# Patient Record
Sex: MALE | Race: WHITE | NOT HISPANIC OR LATINO | Employment: OTHER | URBAN - METROPOLITAN AREA
[De-identification: names, ages, dates, MRNs, and addresses within clinical notes are randomized per-mention and may not be internally consistent; named-entity substitution may affect disease eponyms.]

---

## 2023-08-22 DIAGNOSIS — E03.9 HYPOTHYROIDISM, UNSPECIFIED: ICD-10-CM

## 2023-08-22 DIAGNOSIS — E78.2 MIXED HYPERLIPIDEMIA: ICD-10-CM

## 2023-08-22 RX ORDER — LEVOTHYROXINE SODIUM 112 UG/1
112 TABLET ORAL DAILY
Qty: 90 TABLET | Refills: 1 | Status: SHIPPED | OUTPATIENT
Start: 2023-08-22 | End: 2024-02-19

## 2023-08-22 RX ORDER — ROSUVASTATIN CALCIUM 20 MG/1
20 TABLET, COATED ORAL DAILY
Qty: 90 TABLET | Refills: 1 | Status: SHIPPED | OUTPATIENT
Start: 2023-08-22 | End: 2024-02-19

## 2023-09-25 ENCOUNTER — OFFICE VISIT (OUTPATIENT)
Dept: PRIMARY CARE | Facility: CLINIC | Age: 68
End: 2023-09-25
Payer: MEDICARE

## 2023-09-25 VITALS
WEIGHT: 166 LBS | SYSTOLIC BLOOD PRESSURE: 120 MMHG | HEIGHT: 66 IN | DIASTOLIC BLOOD PRESSURE: 60 MMHG | BODY MASS INDEX: 26.68 KG/M2

## 2023-09-25 DIAGNOSIS — Z12.5 SCREENING FOR PROSTATE CANCER: ICD-10-CM

## 2023-09-25 DIAGNOSIS — E03.9 HYPOTHYROIDISM, UNSPECIFIED TYPE: ICD-10-CM

## 2023-09-25 DIAGNOSIS — R53.83 FATIGUE, UNSPECIFIED TYPE: ICD-10-CM

## 2023-09-25 DIAGNOSIS — Z00.00 ROUTINE GENERAL MEDICAL EXAMINATION AT HEALTH CARE FACILITY: ICD-10-CM

## 2023-09-25 DIAGNOSIS — R39.12 BENIGN PROSTATIC HYPERPLASIA WITH WEAK URINARY STREAM: ICD-10-CM

## 2023-09-25 DIAGNOSIS — E78.2 MIXED HYPERLIPIDEMIA: ICD-10-CM

## 2023-09-25 DIAGNOSIS — Z00.00 MEDICARE ANNUAL WELLNESS VISIT, SUBSEQUENT: Primary | ICD-10-CM

## 2023-09-25 DIAGNOSIS — N40.1 BENIGN PROSTATIC HYPERPLASIA WITH WEAK URINARY STREAM: ICD-10-CM

## 2023-09-25 PROCEDURE — G0439 PPPS, SUBSEQ VISIT: HCPCS | Performed by: INTERNAL MEDICINE

## 2023-09-25 PROCEDURE — 1160F RVW MEDS BY RX/DR IN RCRD: CPT | Performed by: INTERNAL MEDICINE

## 2023-09-25 PROCEDURE — 1036F TOBACCO NON-USER: CPT | Performed by: INTERNAL MEDICINE

## 2023-09-25 PROCEDURE — 1159F MED LIST DOCD IN RCRD: CPT | Performed by: INTERNAL MEDICINE

## 2023-09-25 RX ORDER — PROPRANOLOL/HYDROCHLOROTHIAZID 40 MG-25MG
450 TABLET ORAL AS NEEDED
COMMUNITY

## 2023-09-25 ASSESSMENT — ENCOUNTER SYMPTOMS
NEUROLOGICAL NEGATIVE: 1
HEMATURIA: 0
DYSURIA: 0
CARDIOVASCULAR NEGATIVE: 1
POLYPHAGIA: 0
HEMATOLOGIC/LYMPHATIC NEGATIVE: 1
POLYDIPSIA: 0
GASTROINTESTINAL NEGATIVE: 1
DIFFICULTY URINATING: 0
ARTHRALGIAS: 0
CONSTITUTIONAL NEGATIVE: 1
PSYCHIATRIC NEGATIVE: 1
DEPRESSION: 0
RESPIRATORY NEGATIVE: 1

## 2023-09-25 NOTE — PROGRESS NOTES
"Subjective   Reason for Visit: Raymond Childs is an 68 y.o. male here for a Medicare Wellness visit.          Reviewed all medications by prescribing practitioner or clinical pharmacist (such as prescriptions, OTCs, herbal therapies and supplements) and documented in the medical record.    HPI patient is here for wellness exam he is doing well blood pressure is good he has hyperlipidemia and he is hypothyroid on levothyroxine.    Patient Care Team:  Demetra Castelan MD as PCP - General  Demetra Castelan MD as PCP - [facility] Attributed Provider     Review of Systems   Constitutional: Negative.    HENT: Negative.     Respiratory: Negative.     Cardiovascular: Negative.    Gastrointestinal: Negative.    Endocrine: Negative for cold intolerance, heat intolerance, polydipsia and polyphagia.   Genitourinary:  Negative for difficulty urinating, dysuria, enuresis, hematuria and penile discharge.   Musculoskeletal:  Negative for arthralgias and gait problem.   Neurological: Negative.    Hematological: Negative.    Psychiatric/Behavioral: Negative.         Objective   Vitals:  /60 (BP Location: Left arm, Patient Position: Sitting, BP Cuff Size: Large adult)   Ht 1.664 m (5' 5.5\")   Wt 75.3 kg (166 lb)   BMI 27.20 kg/m²       Physical Exam  Vitals reviewed.   Constitutional:       Appearance: Normal appearance.   HENT:      Head: Atraumatic.   Eyes:      Extraocular Movements: Extraocular movements intact.      Pupils: Pupils are equal, round, and reactive to light.   Abdominal:      Palpations: Abdomen is soft. There is no mass.   Neurological:      General: No focal deficit present.      Mental Status: He is alert and oriented to person, place, and time.   Psychiatric:         Mood and Affect: Mood normal.         Behavior: Behavior normal.         Thought Content: Thought content normal.       Assessment/Plan   Problem List Items Addressed This Visit       Medicare annual wellness visit, subsequent - " Primary     Other Visit Diagnoses       Screening for prostate cancer        Relevant Orders    Prostate Specific Antigen, Screen    Routine general medical examination at health care facility            Patient advised to get blood work done he is on saw palmetto with the BPH and is not waking up at night and not having other issues he is up to date with screening colonoscopies.  His last CAT scan had shown fatty liver which was incidental finding he will get LFTs checked he drinks about 3 beers in a week he is a reformed smoker had smoked for 10 years in his life and was smoking 7 to 8 cigarettes at that time but that was 30 years ago.  Patient advised about flu shots.

## 2023-10-04 ENCOUNTER — LAB (OUTPATIENT)
Dept: LAB | Facility: LAB | Age: 68
End: 2023-10-04
Payer: MEDICARE

## 2023-10-04 DIAGNOSIS — E03.9 HYPOTHYROIDISM, UNSPECIFIED TYPE: ICD-10-CM

## 2023-10-04 DIAGNOSIS — Z12.5 SCREENING FOR PROSTATE CANCER: ICD-10-CM

## 2023-10-04 DIAGNOSIS — R53.83 FATIGUE, UNSPECIFIED TYPE: ICD-10-CM

## 2023-10-04 DIAGNOSIS — E78.2 MIXED HYPERLIPIDEMIA: ICD-10-CM

## 2023-10-04 LAB
ALBUMIN SERPL BCP-MCNC: 4.2 G/DL (ref 3.4–5)
ALP SERPL-CCNC: 59 U/L (ref 33–136)
ALT SERPL W P-5'-P-CCNC: 28 U/L (ref 10–52)
ANION GAP SERPL CALC-SCNC: 10 MMOL/L (ref 10–20)
AST SERPL W P-5'-P-CCNC: 22 U/L (ref 9–39)
BASOPHILS # BLD AUTO: 0.09 X10*3/UL (ref 0–0.1)
BASOPHILS NFR BLD AUTO: 1.7 %
BILIRUB SERPL-MCNC: 0.9 MG/DL (ref 0–1.2)
BUN SERPL-MCNC: 17 MG/DL (ref 6–23)
CALCIUM SERPL-MCNC: 9.4 MG/DL (ref 8.6–10.3)
CHLORIDE SERPL-SCNC: 105 MMOL/L (ref 98–107)
CHOLEST SERPL-MCNC: 145 MG/DL (ref 0–199)
CHOLESTEROL/HDL RATIO: 2.2
CO2 SERPL-SCNC: 30 MMOL/L (ref 21–32)
CREAT SERPL-MCNC: 1.02 MG/DL (ref 0.5–1.3)
EOSINOPHIL # BLD AUTO: 0.21 X10*3/UL (ref 0–0.7)
EOSINOPHIL NFR BLD AUTO: 3.9 %
ERYTHROCYTE [DISTWIDTH] IN BLOOD BY AUTOMATED COUNT: 13.5 % (ref 11.5–14.5)
GFR SERPL CREATININE-BSD FRML MDRD: 80 ML/MIN/1.73M*2
GLUCOSE SERPL-MCNC: 103 MG/DL (ref 74–99)
HCT VFR BLD AUTO: 47.2 % (ref 41–52)
HDLC SERPL-MCNC: 66.1 MG/DL
HGB BLD-MCNC: 15.4 G/DL (ref 13.5–17.5)
IMM GRANULOCYTES # BLD AUTO: 0.01 X10*3/UL (ref 0–0.7)
IMM GRANULOCYTES NFR BLD AUTO: 0.2 % (ref 0–0.9)
LDLC SERPL CALC-MCNC: 62 MG/DL (ref 140–190)
LDLC SERPL DIRECT ASSAY-MCNC: 64 MG/DL (ref 0–129)
LYMPHOCYTES # BLD AUTO: 1.53 X10*3/UL (ref 1.2–4.8)
LYMPHOCYTES NFR BLD AUTO: 28.1 %
MCH RBC QN AUTO: 30.4 PG (ref 26–34)
MCHC RBC AUTO-ENTMCNC: 32.6 G/DL (ref 32–36)
MCV RBC AUTO: 93 FL (ref 80–100)
MONOCYTES # BLD AUTO: 0.55 X10*3/UL (ref 0.1–1)
MONOCYTES NFR BLD AUTO: 10.1 %
NEUTROPHILS # BLD AUTO: 3.06 X10*3/UL (ref 1.2–7.7)
NEUTROPHILS NFR BLD AUTO: 56 %
NON HDL CHOLESTEROL: 79 MG/DL (ref 0–149)
NRBC BLD-RTO: 0 /100 WBCS (ref 0–0)
PLATELET # BLD AUTO: 158 X10*3/UL (ref 150–450)
PMV BLD AUTO: 11.7 FL (ref 7.5–11.5)
POTASSIUM SERPL-SCNC: 4.6 MMOL/L (ref 3.5–5.3)
PROT SERPL-MCNC: 7 G/DL (ref 6.4–8.2)
PSA SERPL-MCNC: 0.45 NG/ML
RBC # BLD AUTO: 5.06 X10*6/UL (ref 4.5–5.9)
SODIUM SERPL-SCNC: 140 MMOL/L (ref 136–145)
TRIGL SERPL-MCNC: 86 MG/DL (ref 0–149)
TSH SERPL-ACNC: 0.74 MIU/L (ref 0.44–3.98)
VLDL: 17 MG/DL (ref 0–40)
WBC # BLD AUTO: 5.5 X10*3/UL (ref 4.4–11.3)

## 2023-10-04 PROCEDURE — 83721 ASSAY OF BLOOD LIPOPROTEIN: CPT

## 2023-10-04 PROCEDURE — 36415 COLL VENOUS BLD VENIPUNCTURE: CPT

## 2024-02-18 DIAGNOSIS — E78.2 MIXED HYPERLIPIDEMIA: ICD-10-CM

## 2024-02-18 DIAGNOSIS — E03.9 HYPOTHYROIDISM, UNSPECIFIED: ICD-10-CM

## 2024-02-19 RX ORDER — LEVOTHYROXINE SODIUM 112 UG/1
112 TABLET ORAL DAILY
Qty: 90 TABLET | Refills: 3 | Status: SHIPPED | OUTPATIENT
Start: 2024-02-19

## 2024-02-19 RX ORDER — ROSUVASTATIN CALCIUM 20 MG/1
20 TABLET, COATED ORAL DAILY
Qty: 90 TABLET | Refills: 3 | Status: SHIPPED | OUTPATIENT
Start: 2024-02-19

## 2024-09-19 ENCOUNTER — APPOINTMENT (OUTPATIENT)
Dept: PRIMARY CARE | Facility: CLINIC | Age: 69
End: 2024-09-19
Payer: MEDICARE

## 2024-09-19 VITALS
HEIGHT: 65 IN | SYSTOLIC BLOOD PRESSURE: 114 MMHG | DIASTOLIC BLOOD PRESSURE: 60 MMHG | BODY MASS INDEX: 28.32 KG/M2 | WEIGHT: 170 LBS

## 2024-09-19 DIAGNOSIS — E03.9 HYPOTHYROIDISM, UNSPECIFIED TYPE: ICD-10-CM

## 2024-09-19 DIAGNOSIS — Z12.11 COLON CANCER SCREENING: ICD-10-CM

## 2024-09-19 DIAGNOSIS — Z00.00 MEDICARE ANNUAL WELLNESS VISIT, SUBSEQUENT: Primary | ICD-10-CM

## 2024-09-19 DIAGNOSIS — Z12.5 PROSTATE CANCER SCREENING: ICD-10-CM

## 2024-09-19 DIAGNOSIS — R53.83 FATIGUE, UNSPECIFIED TYPE: ICD-10-CM

## 2024-09-19 DIAGNOSIS — E78.2 MIXED HYPERLIPIDEMIA: ICD-10-CM

## 2024-09-19 PROCEDURE — G0439 PPPS, SUBSEQ VISIT: HCPCS | Performed by: INTERNAL MEDICINE

## 2024-09-19 PROCEDURE — 3008F BODY MASS INDEX DOCD: CPT | Performed by: INTERNAL MEDICINE

## 2024-09-19 PROCEDURE — 1160F RVW MEDS BY RX/DR IN RCRD: CPT | Performed by: INTERNAL MEDICINE

## 2024-09-19 PROCEDURE — 1159F MED LIST DOCD IN RCRD: CPT | Performed by: INTERNAL MEDICINE

## 2024-09-19 PROCEDURE — 1036F TOBACCO NON-USER: CPT | Performed by: INTERNAL MEDICINE

## 2024-09-19 ASSESSMENT — PATIENT HEALTH QUESTIONNAIRE - PHQ9
2. FEELING DOWN, DEPRESSED OR HOPELESS: NOT AT ALL
1. LITTLE INTEREST OR PLEASURE IN DOING THINGS: NOT AT ALL
SUM OF ALL RESPONSES TO PHQ9 QUESTIONS 1 & 2: 0

## 2024-09-19 ASSESSMENT — LIFESTYLE VARIABLES
HOW OFTEN DO YOU HAVE SIX OR MORE DRINKS ON ONE OCCASION: NEVER
HOW MANY STANDARD DRINKS CONTAINING ALCOHOL DO YOU HAVE ON A TYPICAL DAY: PATIENT DOES NOT DRINK
HOW OFTEN DO YOU HAVE A DRINK CONTAINING ALCOHOL: NEVER
SKIP TO QUESTIONS 9-10: 1
AUDIT-C TOTAL SCORE: 0

## 2024-09-19 ASSESSMENT — ENCOUNTER SYMPTOMS
ENDOCRINE NEGATIVE: 1
EYES NEGATIVE: 1
CONSTITUTIONAL NEGATIVE: 1
CARDIOVASCULAR NEGATIVE: 1

## 2024-09-19 NOTE — PROGRESS NOTES
"Subjective   Patient ID: Raymond Childs is a 69 y.o. male who presents for Annual Exam (Patient here for follow up).    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    Review of Systems   Constitutional: Negative.    Eyes: Negative.    Cardiovascular: Negative.    Endocrine: Negative.    Genitourinary: Negative.    Musculoskeletal:         Arthritis Hands,Knees   All other systems reviewed and are negative.      Objective   /60 (BP Location: Left arm, Patient Position: Sitting, BP Cuff Size: Adult long)   Ht 1.651 m (5' 5\")   Wt 77.1 kg (170 lb)   BMI 28.29 kg/m²     Physical Exam  Vitals reviewed.   Constitutional:       Appearance: Normal appearance.   HENT:      Head: Normocephalic and atraumatic.   Cardiovascular:      Rate and Rhythm: Normal rate and regular rhythm.   Pulmonary:      Effort: Pulmonary effort is normal.      Breath sounds: Normal breath sounds.   Abdominal:      Palpations: Abdomen is soft.   Musculoskeletal:      Right lower leg: No edema.      Left lower leg: No edema.   Neurological:      General: No focal deficit present.      Mental Status: He is alert and oriented to person, place, and time.   Psychiatric:         Mood and Affect: Mood normal.         Behavior: Behavior normal.         Thought Content: Thought content normal.       Assessment/Plan   Problem List Items Addressed This Visit             ICD-10-CM    Medicare annual wellness visit, subsequent - Primary Z00.00    Relevant Orders    CBC and Auto Differential     Other Visit Diagnoses         Codes    Mixed hyperlipidemia     E78.2    Relevant Orders    Cholesterol, LDL Direct    Comprehensive Metabolic Panel    Lipid Panel    Hypothyroidism, unspecified type     E03.9    Relevant " Orders    TSH with reflex to Free T4 if abnormal    Fatigue, unspecified type     R53.83    Relevant Orders    CBC and Auto Differential    Prostate cancer screening     Z12.5    Relevant Orders    Prostate Specific Antigen          Blood work ordered.  Continue levothyroxine for hypothyroidism.  Continue rosuvastatin for hyperlipidemia.  Follow-up with us in 6 to 12 months.

## 2024-09-24 ENCOUNTER — LAB (OUTPATIENT)
Dept: LAB | Facility: LAB | Age: 69
End: 2024-09-24
Payer: MEDICARE

## 2024-09-24 DIAGNOSIS — Z12.5 PROSTATE CANCER SCREENING: ICD-10-CM

## 2024-09-24 DIAGNOSIS — E78.2 MIXED HYPERLIPIDEMIA: ICD-10-CM

## 2024-09-24 DIAGNOSIS — E03.9 HYPOTHYROIDISM, UNSPECIFIED TYPE: ICD-10-CM

## 2024-09-24 DIAGNOSIS — R53.83 FATIGUE, UNSPECIFIED TYPE: ICD-10-CM

## 2024-09-24 DIAGNOSIS — Z00.00 MEDICARE ANNUAL WELLNESS VISIT, SUBSEQUENT: ICD-10-CM

## 2024-09-24 LAB
ALBUMIN SERPL BCP-MCNC: 4.1 G/DL (ref 3.4–5)
ALP SERPL-CCNC: 60 U/L (ref 33–136)
ALT SERPL W P-5'-P-CCNC: 19 U/L (ref 10–52)
ANION GAP SERPL CALC-SCNC: 11 MMOL/L (ref 10–20)
AST SERPL W P-5'-P-CCNC: 17 U/L (ref 9–39)
BASOPHILS # BLD AUTO: 0.06 X10*3/UL (ref 0–0.1)
BASOPHILS NFR BLD AUTO: 1.3 %
BILIRUB SERPL-MCNC: 1 MG/DL (ref 0–1.2)
BUN SERPL-MCNC: 16 MG/DL (ref 6–23)
CALCIUM SERPL-MCNC: 9.5 MG/DL (ref 8.6–10.3)
CHLORIDE SERPL-SCNC: 105 MMOL/L (ref 98–107)
CHOLEST SERPL-MCNC: 139 MG/DL (ref 0–199)
CHOLESTEROL/HDL RATIO: 2.4
CO2 SERPL-SCNC: 27 MMOL/L (ref 21–32)
CREAT SERPL-MCNC: 1 MG/DL (ref 0.5–1.3)
EGFRCR SERPLBLD CKD-EPI 2021: 81 ML/MIN/1.73M*2
EOSINOPHIL # BLD AUTO: 0.13 X10*3/UL (ref 0–0.7)
EOSINOPHIL NFR BLD AUTO: 2.8 %
ERYTHROCYTE [DISTWIDTH] IN BLOOD BY AUTOMATED COUNT: 13.2 % (ref 11.5–14.5)
GLUCOSE SERPL-MCNC: 90 MG/DL (ref 74–99)
HCT VFR BLD AUTO: 46.2 % (ref 41–52)
HDLC SERPL-MCNC: 58.5 MG/DL
HGB BLD-MCNC: 15.2 G/DL (ref 13.5–17.5)
IMM GRANULOCYTES # BLD AUTO: 0 X10*3/UL (ref 0–0.7)
IMM GRANULOCYTES NFR BLD AUTO: 0 % (ref 0–0.9)
LDLC SERPL CALC-MCNC: 69 MG/DL
LDLC SERPL DIRECT ASSAY-MCNC: 68 MG/DL (ref 0–129)
LYMPHOCYTES # BLD AUTO: 1.61 X10*3/UL (ref 1.2–4.8)
LYMPHOCYTES NFR BLD AUTO: 35.2 %
MCH RBC QN AUTO: 30.5 PG (ref 26–34)
MCHC RBC AUTO-ENTMCNC: 32.9 G/DL (ref 32–36)
MCV RBC AUTO: 93 FL (ref 80–100)
MONOCYTES # BLD AUTO: 0.5 X10*3/UL (ref 0.1–1)
MONOCYTES NFR BLD AUTO: 10.9 %
NEUTROPHILS # BLD AUTO: 2.27 X10*3/UL (ref 1.2–7.7)
NEUTROPHILS NFR BLD AUTO: 49.8 %
NON HDL CHOLESTEROL: 81 MG/DL (ref 0–149)
NRBC BLD-RTO: 0 /100 WBCS (ref 0–0)
PLATELET # BLD AUTO: 170 X10*3/UL (ref 150–450)
POTASSIUM SERPL-SCNC: 4.4 MMOL/L (ref 3.5–5.3)
PROT SERPL-MCNC: 6.7 G/DL (ref 6.4–8.2)
PSA SERPL-MCNC: 0.38 NG/ML
RBC # BLD AUTO: 4.99 X10*6/UL (ref 4.5–5.9)
SODIUM SERPL-SCNC: 139 MMOL/L (ref 136–145)
T4 FREE SERPL-MCNC: 1.35 NG/DL (ref 0.61–1.12)
TRIGL SERPL-MCNC: 59 MG/DL (ref 0–149)
TSH SERPL-ACNC: 0.32 MIU/L (ref 0.44–3.98)
VLDL: 12 MG/DL (ref 0–40)
WBC # BLD AUTO: 4.6 X10*3/UL (ref 4.4–11.3)

## 2024-09-24 PROCEDURE — 85025 COMPLETE CBC W/AUTO DIFF WBC: CPT

## 2024-09-24 PROCEDURE — 84443 ASSAY THYROID STIM HORMONE: CPT

## 2024-09-24 PROCEDURE — 80061 LIPID PANEL: CPT

## 2024-09-24 PROCEDURE — 80053 COMPREHEN METABOLIC PANEL: CPT

## 2024-09-24 PROCEDURE — 83721 ASSAY OF BLOOD LIPOPROTEIN: CPT

## 2024-09-24 PROCEDURE — G0103 PSA SCREENING: HCPCS

## 2024-09-24 PROCEDURE — 84439 ASSAY OF FREE THYROXINE: CPT

## 2024-09-24 PROCEDURE — 36415 COLL VENOUS BLD VENIPUNCTURE: CPT

## 2024-10-23 ENCOUNTER — APPOINTMENT (OUTPATIENT)
Dept: SURGERY | Facility: CLINIC | Age: 69
End: 2024-10-23
Payer: MEDICARE

## 2024-10-23 VITALS
BODY MASS INDEX: 28.29 KG/M2 | SYSTOLIC BLOOD PRESSURE: 115 MMHG | DIASTOLIC BLOOD PRESSURE: 73 MMHG | WEIGHT: 170 LBS | OXYGEN SATURATION: 96 % | HEART RATE: 76 BPM

## 2024-10-23 DIAGNOSIS — Z12.11 COLON CANCER SCREENING: Primary | ICD-10-CM

## 2024-10-23 PROCEDURE — 1126F AMNT PAIN NOTED NONE PRSNT: CPT | Performed by: SURGERY

## 2024-10-23 PROCEDURE — 99202 OFFICE O/P NEW SF 15 MIN: CPT | Performed by: SURGERY

## 2024-10-23 PROCEDURE — 1159F MED LIST DOCD IN RCRD: CPT | Performed by: SURGERY

## 2024-10-23 ASSESSMENT — ENCOUNTER SYMPTOMS
NEUROLOGICAL NEGATIVE: 1
CONSTITUTIONAL NEGATIVE: 1
ALLERGIC/IMMUNOLOGIC NEGATIVE: 1
GASTROINTESTINAL NEGATIVE: 1
ENDOCRINE NEGATIVE: 1
MUSCULOSKELETAL NEGATIVE: 1
CARDIOVASCULAR NEGATIVE: 1
EYES NEGATIVE: 1
RESPIRATORY NEGATIVE: 1
PSYCHIATRIC NEGATIVE: 1
HEMATOLOGIC/LYMPHATIC NEGATIVE: 1

## 2024-10-23 ASSESSMENT — PAIN SCALES - GENERAL: PAINLEVEL_OUTOF10: 0-NO PAIN

## 2024-10-23 NOTE — PROGRESS NOTES
Subjective   Patient ID: Raymond Childs is a 69 y.o. male who presents for Colon Cancer Screening.  HPI  Patient presents to discuss colonoscopy. No complaints at the present time including change of bowel habits, blood in the stool, hemorrhoid complaints, or abdominal pain. No upper abdominal complaints including reflux, dysphagia, heartburn, early satiety. Weight stable.    Last 10 years ago, normal    Review of Systems   Constitutional: Negative.    HENT: Negative.     Eyes: Negative.    Respiratory: Negative.     Cardiovascular: Negative.    Gastrointestinal: Negative.    Endocrine: Negative.    Genitourinary: Negative.    Musculoskeletal: Negative.    Skin: Negative.    Allergic/Immunologic: Negative.    Neurological: Negative.    Hematological: Negative.    Psychiatric/Behavioral: Negative.           Past Medical History:   Diagnosis Date    Other specified health status     No pertinent past medical history    Personal history of other diseases of the musculoskeletal system and connective tissue 01/21/2021    History of arthritis    Unspecified visual loss 01/21/2021    Vision problems     Past Surgical History:   Procedure Laterality Date    COLONOSCOPY  09/15/2017    Colonoscopy (Fiberoptic)    ESOPHAGOGASTRODUODENOSCOPY  10/13/2017    Diagnostic Esophagogastroduodenoscopy    HERNIA REPAIR  10/28/2021    Hernia Repair    LYMPH NODE BIOPSY  04/21/2017    Biopsy Lymph Node    OTHER SURGICAL HISTORY  01/21/2021    Joint replacement procedure    OTHER SURGICAL HISTORY  10/28/2021    Esophagogastroduodenoscopy    OTHER SURGICAL HISTORY  10/28/2021    Lymph node biopsy    OTHER SURGICAL HISTORY  10/27/2017    Esophagogastrectomy    TONSILLECTOMY  04/21/2017    Tonsillectomy     No family history on file.   Social History     Socioeconomic History    Marital status:    Tobacco Use    Smoking status: Never     Passive exposure: Never    Smokeless tobacco: Never   Substance and Sexual Activity    Alcohol  use: Not Currently     Comment: social drinker    Drug use: Never          Current Outpatient Medications:     levothyroxine (Synthroid, Levoxyl) 112 mcg tablet, TAKE 1 TABLET BY MOUTH EVERY DAY AS DIRECTED, Disp: 90 tablet, Rfl: 3    rosuvastatin (Crestor) 20 mg tablet, TAKE 1 TABLET BY MOUTH EVERY DAY, Disp: 90 tablet, Rfl: 3    turmeric-turmeric root extract 450-50 mg capsule, Take 450 mg by mouth if needed., Disp: , Rfl:      Objective   Vitals:    10/23/24 1436   BP: 115/73   Pulse: 76   SpO2: 96%      Physical Exam  Constitutional:       General: He is not in acute distress.     Appearance: Normal appearance. He is normal weight. He is not toxic-appearing or diaphoretic.   HENT:      Head: Normocephalic and atraumatic.   Eyes:      Conjunctiva/sclera: Conjunctivae normal.      Pupils: Pupils are equal, round, and reactive to light.   Cardiovascular:      Rate and Rhythm: Normal rate and regular rhythm.   Pulmonary:      Effort: Pulmonary effort is normal. No respiratory distress.   Abdominal:      General: Abdomen is flat. There is no distension.      Palpations: Abdomen is soft. There is no mass.      Tenderness: There is no abdominal tenderness.      Hernia: No hernia is present.   Musculoskeletal:         General: No swelling.   Skin:     General: Skin is warm and dry.   Neurological:      General: No focal deficit present.      Mental Status: He is alert. Mental status is at baseline.   Psychiatric:         Mood and Affect: Mood normal.         Behavior: Behavior normal.         Thought Content: Thought content normal.         Judgment: Judgment normal.           Assessment/Plan   Problem List Items Addressed This Visit    None  Visit Diagnoses         Codes    Colon cancer screening    -  Primary Z12.11    Relevant Orders    Colonoscopy Screening; Average Risk Patient          Patient is scheduled for colonoscopy. Indications for procedure discuss including risks of bleeding, perforation. Details of prep  discussed. The patient understands and wishes to proceed    Isabela Potter MD

## 2024-11-19 ENCOUNTER — CLINICAL SUPPORT (OUTPATIENT)
Dept: PREADMISSION TESTING | Facility: HOSPITAL | Age: 69
End: 2024-11-19
Payer: MEDICARE

## 2024-11-19 VITALS — WEIGHT: 175 LBS | BODY MASS INDEX: 28.12 KG/M2 | HEIGHT: 66 IN

## 2024-11-19 RX ORDER — UBIDECARENONE 30 MG
30 CAPSULE ORAL DAILY
COMMUNITY

## 2024-11-19 RX ORDER — CHROMIUM PICOLINATE 200 MCG
1 TABLET ORAL DAILY
COMMUNITY

## 2024-11-19 NOTE — PREPROCEDURE INSTRUCTIONS

## 2024-11-30 ENCOUNTER — PREP FOR PROCEDURE (OUTPATIENT)
Dept: SURGERY | Facility: CLINIC | Age: 69
End: 2024-11-30
Payer: MEDICARE

## 2024-12-02 ENCOUNTER — ANESTHESIA (OUTPATIENT)
Dept: GASTROENTEROLOGY | Facility: HOSPITAL | Age: 69
End: 2024-12-02
Payer: MEDICARE

## 2024-12-02 ENCOUNTER — ANESTHESIA EVENT (OUTPATIENT)
Dept: GASTROENTEROLOGY | Facility: HOSPITAL | Age: 69
End: 2024-12-02
Payer: MEDICARE

## 2024-12-02 ENCOUNTER — HOSPITAL ENCOUNTER (OUTPATIENT)
Dept: GASTROENTEROLOGY | Facility: HOSPITAL | Age: 69
Discharge: HOME | End: 2024-12-02
Payer: MEDICARE

## 2024-12-02 VITALS
OXYGEN SATURATION: 95 % | SYSTOLIC BLOOD PRESSURE: 114 MMHG | BODY MASS INDEX: 28.25 KG/M2 | TEMPERATURE: 98.1 F | HEART RATE: 73 BPM | DIASTOLIC BLOOD PRESSURE: 81 MMHG | HEIGHT: 66 IN | RESPIRATION RATE: 16 BRPM

## 2024-12-02 DIAGNOSIS — Z12.11 COLON CANCER SCREENING: Primary | ICD-10-CM

## 2024-12-02 PROCEDURE — 3700000002 HC GENERAL ANESTHESIA TIME - EACH INCREMENTAL 1 MINUTE

## 2024-12-02 PROCEDURE — 2500000004 HC RX 250 GENERAL PHARMACY W/ HCPCS (ALT 636 FOR OP/ED): Performed by: NURSE ANESTHETIST, CERTIFIED REGISTERED

## 2024-12-02 PROCEDURE — 7100000010 HC PHASE TWO TIME - EACH INCREMENTAL 1 MINUTE

## 2024-12-02 PROCEDURE — 7100000009 HC PHASE TWO TIME - INITIAL BASE CHARGE

## 2024-12-02 PROCEDURE — 45380 COLONOSCOPY AND BIOPSY: CPT | Performed by: SURGERY

## 2024-12-02 PROCEDURE — 3700000001 HC GENERAL ANESTHESIA TIME - INITIAL BASE CHARGE

## 2024-12-02 RX ORDER — PROPOFOL 10 MG/ML
INJECTION, EMULSION INTRAVENOUS AS NEEDED
Status: DISCONTINUED | OUTPATIENT
Start: 2024-12-02 | End: 2024-12-02

## 2024-12-02 RX ORDER — PROPOFOL 10 MG/ML
INJECTION, EMULSION INTRAVENOUS CONTINUOUS PRN
Status: DISCONTINUED | OUTPATIENT
Start: 2024-12-02 | End: 2024-12-02

## 2024-12-02 RX ORDER — LIDOCAINE HYDROCHLORIDE 20 MG/ML
INJECTION, SOLUTION INFILTRATION; PERINEURAL AS NEEDED
Status: DISCONTINUED | OUTPATIENT
Start: 2024-12-02 | End: 2024-12-02

## 2024-12-02 SDOH — HEALTH STABILITY: MENTAL HEALTH: CURRENT SMOKER: 0

## 2024-12-02 ASSESSMENT — ENCOUNTER SYMPTOMS
EYES NEGATIVE: 1
ENDOCRINE NEGATIVE: 1
NEUROLOGICAL NEGATIVE: 1
CARDIOVASCULAR NEGATIVE: 1
CONSTITUTIONAL NEGATIVE: 1
GASTROINTESTINAL NEGATIVE: 1
RESPIRATORY NEGATIVE: 1
ALLERGIC/IMMUNOLOGIC NEGATIVE: 1
MUSCULOSKELETAL NEGATIVE: 1
PSYCHIATRIC NEGATIVE: 1
HEMATOLOGIC/LYMPHATIC NEGATIVE: 1

## 2024-12-02 ASSESSMENT — PAIN - FUNCTIONAL ASSESSMENT
PAIN_FUNCTIONAL_ASSESSMENT: 0-10

## 2024-12-02 ASSESSMENT — PAIN SCALES - GENERAL
PAINLEVEL_OUTOF10: 8
PAINLEVEL_OUTOF10: 0 - NO PAIN
PAIN_LEVEL: 0
PAINLEVEL_OUTOF10: 9
PAINLEVEL_OUTOF10: 0 - NO PAIN

## 2024-12-02 ASSESSMENT — COLUMBIA-SUICIDE SEVERITY RATING SCALE - C-SSRS
6. HAVE YOU EVER DONE ANYTHING, STARTED TO DO ANYTHING, OR PREPARED TO DO ANYTHING TO END YOUR LIFE?: NO
2. HAVE YOU ACTUALLY HAD ANY THOUGHTS OF KILLING YOURSELF?: NO
1. IN THE PAST MONTH, HAVE YOU WISHED YOU WERE DEAD OR WISHED YOU COULD GO TO SLEEP AND NOT WAKE UP?: NO

## 2024-12-02 NOTE — Clinical Note
Patient tolerated procedure well. Appears comfortable with no complaints of pain. VS stable. Arousable prior to transport. Patient transported to [facility] via cart.  Report called   per crna           . Handoff completed

## 2024-12-02 NOTE — ANESTHESIA POSTPROCEDURE EVALUATION
Patient: Raymond Childs    Procedure Summary       Date: 12/02/24 Room / Location: [facility]    Anesthesia Start: 0903 Anesthesia Stop:     Procedure: COLONOSCOPY Diagnosis:       Colon cancer screening      Colon cancer screening    Scheduled Providers: Isabela Potter MD; Paco Parra MD; Linnette Leung RN; Ivet Reis Responsible Provider: Paco Parra MD    Anesthesia Type: MAC ASA Status: 2            Anesthesia Type: MAC    Vitals Value Taken Time   /63 12/02/24 0922   Temp 36.1 12/02/24 0922   Pulse 79 12/02/24 0922   Resp 17 12/02/24 0922   SpO2 94 12/02/24 0922       Anesthesia Post Evaluation    Patient location during evaluation: bedside  Patient participation: complete - patient participated  Level of consciousness: awake and alert  Pain score: 0  Pain management: adequate  Airway patency: patent  Cardiovascular status: hemodynamically stable  Respiratory status: nonlabored ventilation, spontaneous ventilation and room air  Hydration status: acceptable  Postoperative Nausea and Vomiting: none        There were no known notable events for this encounter.

## 2024-12-02 NOTE — Clinical Note
Huddle and Timeout completed together with team. Patient wristband and GHADA information verified.  Anesthesia safety check completed

## 2024-12-02 NOTE — H&P
History Of Present Illness  Raymond Childs is a 69 y.o. male presenting with screening.     Past Medical History  Past Medical History:   Diagnosis Date    Arthritis     Cataract     High cholesterol     History of snoring     Hypothyroidism     Inguinal hernia     bilateral    Personal history of other diseases of the musculoskeletal system and connective tissue 01/21/2021    History of arthritis    Psoriasis of scalp     Sarcoidosis     pt states was cured with cortisone injection    Unspecified visual loss 01/21/2021    Vision problems    Wears dentures        Surgical History  Past Surgical History:   Procedure Laterality Date    CATARACT EXTRACTION      COLONOSCOPY  09/15/2017    Colonoscopy (Fiberoptic)    HERNIA REPAIR  10/28/2021    Hernia Repair inguinal 2 on the right 1 on the left    KNEE SURGERY Right 01/21/2021    Joint replacement procedure    LYMPH NODE BIOPSY  04/21/2017    Biopsy Lymph Node    TONSILLECTOMY  04/21/2017    Tonsillectomy        Social History  He reports that he has never smoked. He has never been exposed to tobacco smoke. He has never used smokeless tobacco. He reports that he does not currently use alcohol. He reports current drug use. Drug: Marijuana.    Family History  No family history on file.     Allergies  Patient has no known allergies.    Review of Systems   Constitutional: Negative.    HENT: Negative.     Eyes: Negative.    Respiratory: Negative.     Cardiovascular: Negative.    Gastrointestinal: Negative.    Endocrine: Negative.    Genitourinary: Negative.    Musculoskeletal: Negative.    Skin: Negative.    Allergic/Immunologic: Negative.    Neurological: Negative.    Hematological: Negative.    Psychiatric/Behavioral: Negative.          Physical Exam  Constitutional:       General: He is not in acute distress.     Appearance: Normal appearance. He is normal weight. He is not toxic-appearing or diaphoretic.   HENT:      Head: Normocephalic and atraumatic.   Eyes:       "Conjunctiva/sclera: Conjunctivae normal.      Pupils: Pupils are equal, round, and reactive to light.   Cardiovascular:      Rate and Rhythm: Normal rate and regular rhythm.   Pulmonary:      Effort: Pulmonary effort is normal. No respiratory distress.   Abdominal:      General: Abdomen is flat. There is no distension.      Palpations: Abdomen is soft. There is no mass.      Tenderness: There is no abdominal tenderness.      Hernia: No hernia is present.   Musculoskeletal:         General: No swelling.   Skin:     General: Skin is warm and dry.   Neurological:      General: No focal deficit present.      Mental Status: He is alert. Mental status is at baseline.   Psychiatric:         Mood and Affect: Mood normal.         Behavior: Behavior normal.         Thought Content: Thought content normal.         Judgment: Judgment normal.          Last Recorded Vitals  Blood pressure 115/81, pulse 81, temperature 36.4 °C (97.5 °F), temperature source Temporal, resp. rate 18, height 1.676 m (5' 6\"), SpO2 96%.    Relevant Results             Assessment/Plan   Assessment & Plan  Colon cancer screening      colonoscopy       I spent  minutes in the professional and overall care of this patient.      Isabela Potter MD    "

## 2024-12-02 NOTE — ANESTHESIA PREPROCEDURE EVALUATION
Patient: Raymond Childs    Procedure Information       Date/Time: 12/02/24 0915    Scheduled providers: Isabela Potter MD; Paco Parra MD; Linnette Leung RN; Ivet Reis    Procedure: COLONOSCOPY    Location: [facility]            Relevant Problems   No relevant active problems       Clinical information reviewed:   Tobacco  Allergies  Meds  Problems  Med Hx  Surg Hx   Fam Hx  Soc   Hx        NPO Detail:  NPO/Void Status  Carbohydrate Drink Given Prior to Surgery? : N  Date of Last Liquid: 12/01/24  Time of Last Liquid: 1900  Date of Last Solid: 11/30/24  Time of Last Solid: 1800  Last Intake Type: Clear fluids  Time of Last Void: 0730         Physical Exam    Airway  Mallampati: I  TM distance: >3 FB  Neck ROM: full     Cardiovascular - normal exam     Dental    Pulmonary - normal exam     Abdominal - normal exam             Anesthesia Plan    History of general anesthesia?: yes  History of complications of general anesthesia?: no    ASA 2     MAC     The patient is not a current smoker.  Patient did not smoke on day of procedure.    intravenous induction   Anesthetic plan and risks discussed with patient.    Plan discussed with CRNA and attending.

## 2024-12-02 NOTE — DISCHARGE INSTRUCTIONS
Patient Instructions after a Colonoscopy      The anesthetics, sedatives or narcotics which were given to you today will be acting in your body for the next 24 hours, so you might feel a little sleepy or groggy.  This feeling should slowly wear off. Carefully read and follow the instructions.     You received sedation today:  - Do not drive or operate any machinery or power tools of any kind.   - No alcoholic beverages today, not even beer or wine.  - Do not make any important decisions or sign any legal documents.  - No over the counter medications that contain alcohol or that may cause drowsiness.  - Do not make any important decisions or sign any legal documents.    While it is common to experience mild to moderate abdominal distention, gas, or belching after your procedure, if any of these symptoms occur following discharge from the GI Lab or within one week of having your procedure, call the [facility] to be advised whether a visit to your nearest Urgent Care or Emergency Department is indicated.  Take this paper with you if you go.     - If you develop an allergic reaction to the medications that were given during your procedure such as difficulty breathing, rash, hives, severe nausea, vomiting or lightheadedness.  - If you experience chest pain, shortness of breath, severe abdominal pain, fevers and chills.  -If you develop signs and symptoms of bleeding such as blood in your spit, if your stools turn black, tarry, or bloody  - If you have not urinated within 8 hours following your procedure.  - If your IV site becomes painful, red, inflamed, or looks infected.    If you received a biopsy/polypectomy/sphincterotomy the following instructions apply below:    __ Do not use Aspirin containing products, non-steroidal medications or anti-coagulants for one week following your procedure. (Examples of these types of medications are: Advil, Arthrotec, Aleve, Coumadin, Ecotrin, Heparin, Ibuprofen,  Indocin, Motrin, Naprosyn, Nuprin, Plavix, Vioxx, and Voltarin, or their generic forms.  This list is not all-inclusive.  Check with your physician or pharmacist before resuming medications.)   __ Eat a soft diet today.  Avoid foods that are poorly digested for the next 24 hours.  These foods would include: nuts, beans, lettuce, red meats, and fried foods. Start with liquids and advance your diet as tolerated, gradually work up to eating solids.   __ Do not have a Barium Study or Enema for one week.    Your physician recommends the additional following instructions:    -You have a contact number available for emergencies. The signs and symptoms of potential delayed complications were discussed with you. You may return to normal activities tomorrow.  -Resume your previous diet.  -Continue your present medications.   -We are waiting for your pathology results.  -Your physician has recommended a repeat colonoscopy (date to be determined after pending pathology results are reviewed) for surveillance based on pathology results.  -The findings and recommendations have been discussed with you.  -The findings and recommendations were discussed with your family.  - Please see Medication Reconciliation Form for new medication/medications prescribed.       If you experience any problems or have any questions following discharge from the GI Lab, please call:  Before 5p.m.  [phone]  After 5p.m.    [phone]    Nurse Signature                                                                        Date___________________                                                                            Patient/Responsible Party Signature                                        Date___________________    High Fiber Diet    About this topic  Dietary fiber helps many illnesses. It can help you if you cannot have a bowel movement or if you have loose stools. Fiber can also lower your risk of diabetes and heart disease. Fiber can  help with weight loss by helping you feel dominguez after meals.  You can find fiber in fruits, vegetables, nuts and seeds, whole grains, and legumes. The fiber is the part of the plant food that your body cannot break down and absorb. It passes through your stomach, small bowel, colon, and out your body.  There are two kinds of fiber: Insoluble and soluble fiber. Insoluble fiber helps you pass foods through your digestive system. Insoluble fiber can help you with hard stools. Soluble fiber draws water in and turns it into a gel-like form making digestion slow down. Both are important.    What will the results be?  A high fiber diet can help you with bowel problems like stools that are too hard or too loose. It can also help prevent hemorrhoids and other colon problems. A high fiber diet can also help control your weight and lower blood sugar and cholesterol levels.  What changes to diet are needed?  The amount of fiber you need is based on your age, gender, and health.  Try to get 20 to 35 grams of fiber in your diet each day. Most people in the [place] only eat 15 grams of fiber daily.  Drink at least 8 cups (1920 mL) of fluid each day.  When is this diet used?  Your doctor may talk with you about this diet if you have belly problems.  Who should use this diet?  Older children, young people, and adults can have this diet.  Who should not use this diet?  Some people should not use this diet. Check with your doctor if you have:  Diverticulitis  Active Crohn's disease  Ulcerative colitis  Bowel inflammation  Certain types of GI surgery  Talk to your child's doctor before starting your child on a high fiber diet.  What foods are good to eat?  To get the most from fiber in your diet, eat a wide variety of high fiber foods. Some examples are:  Vegetables like:  Spinach  Peas  Artichoke  Sweet potatoes with skin  Broccoli  Fruits like:  Raspberries  Blueberries  Blackberries  Apples with skin  Dried fruits  Grains like:  Oat  bran  Barley  Whole wheat products  Wheat bran  Dried beans and nuts like:  Sunflower seeds  Almonds  Black beans  Chickpeas  What problems could happen?  Sudden increase of fiber intake can lead to gas, pain, fullness in your belly, and loose stools. Increase fiber gradually while drinking plenty of fluids.  Do not eat too much fiber. Your body will not take in vitamins and minerals as well if you eat too much fiber.  When do I need to call the doctor?  Health problem is not better or you are feeling worse.  Helpful tips  Start slow as you add more fiber to your diet. This may help prevent gas or cramps.  Try to eat the same amount of fiber each day. Aim to get your fiber from nutritious foods. Supplements do not offer the same benefits as food.  Read food labels with care to learn how much fiber is in the food you are eating.  If possible, do not peel fruits or vegetables before you eat them. Eating the peel gives you more fiber.  Where can I learn more?  Eat Right  https://www.eatright.org/food/vitamins-and-supplements/types-of-vitamins-and-nutrients/easy-ways-to-boost-fiber-in-your-daily-diet  Last Reviewed Date  2021-09-23

## 2024-12-11 LAB
LABORATORY COMMENT REPORT: NORMAL
PATH REPORT.FINAL DX SPEC: NORMAL
PATH REPORT.GROSS SPEC: NORMAL
PATH REPORT.RELEVANT HX SPEC: NORMAL
PATH REPORT.TOTAL CANCER: NORMAL

## 2024-12-27 ENCOUNTER — APPOINTMENT (OUTPATIENT)
Dept: SURGERY | Facility: CLINIC | Age: 69
End: 2024-12-27
Payer: MEDICARE

## 2024-12-27 VITALS
WEIGHT: 175 LBS | HEIGHT: 66 IN | BODY MASS INDEX: 28.12 KG/M2 | DIASTOLIC BLOOD PRESSURE: 80 MMHG | SYSTOLIC BLOOD PRESSURE: 120 MMHG

## 2024-12-27 DIAGNOSIS — R10.32 LEFT INGUINAL PAIN: Primary | ICD-10-CM

## 2024-12-27 PROCEDURE — 99213 OFFICE O/P EST LOW 20 MIN: CPT | Performed by: SURGERY

## 2024-12-27 PROCEDURE — 3008F BODY MASS INDEX DOCD: CPT | Performed by: SURGERY

## 2024-12-27 PROCEDURE — 1159F MED LIST DOCD IN RCRD: CPT | Performed by: SURGERY

## 2024-12-27 RX ORDER — GABAPENTIN 300 MG/1
300 CAPSULE ORAL 3 TIMES DAILY
Qty: 90 CAPSULE | Refills: 1 | Status: SHIPPED | OUTPATIENT
Start: 2024-12-27 | End: 2025-02-25

## 2024-12-27 RX ORDER — LIDOCAINE 50 MG/G
1 PATCH TOPICAL DAILY
Qty: 10 PATCH | Refills: 2 | Status: SHIPPED | OUTPATIENT
Start: 2024-12-27 | End: 2025-03-27

## 2024-12-27 NOTE — PROGRESS NOTES
Subjective   Patient ID: Raymond Childs is a 69 y.o. male who presents for Follow-up (Discomfort at surgery site).  HPI    Remote open bilateral inguinal hernia repairs.  Right x 2, left x 1.  No details from surgery available per patient.  On occasion, few times per year will have exacerbation of discomfort described as a tracking burning sensation in the left groin associated with activity.  No bulge.  Self-limited but is bothersome to the patient.  Review of Systems      Past Medical History:   Diagnosis Date    Arthritis     Cataract     High cholesterol     History of snoring     Hypothyroidism     Inguinal hernia     bilateral    Personal history of other diseases of the musculoskeletal system and connective tissue 01/21/2021    History of arthritis    Psoriasis of scalp     Sarcoidosis     pt states was cured with cortisone injection    Unspecified visual loss 01/21/2021    Vision problems    Wears dentures      Past Surgical History:   Procedure Laterality Date    CATARACT EXTRACTION      COLONOSCOPY  09/15/2017    Colonoscopy (Fiberoptic)    HERNIA REPAIR  10/28/2021    Hernia Repair inguinal 2 on the right 1 on the left    KNEE SURGERY Right 01/21/2021    Joint replacement procedure    LYMPH NODE BIOPSY  04/21/2017    Biopsy Lymph Node    TONSILLECTOMY  04/21/2017    Tonsillectomy     No family history on file.   Social History     Socioeconomic History    Marital status:    Tobacco Use    Smoking status: Never     Passive exposure: Never    Smokeless tobacco: Never   Vaping Use    Vaping status: Never Used   Substance and Sexual Activity    Alcohol use: Not Currently     Comment: social drinker    Drug use: Yes     Types: Marijuana     Comment: CBD gummies          Current Outpatient Medications:     ascorbic acid-elderberry fruit 100-50 mg tablet,chewable, Chew 1 mg once daily., Disp: , Rfl:     co-enzyme Q-10 30 mg capsule, Take 1 capsule (30 mg) by mouth once daily., Disp: , Rfl:      levothyroxine (Synthroid, Levoxyl) 112 mcg tablet, TAKE 1 TABLET BY MOUTH EVERY DAY AS DIRECTED, Disp: 90 tablet, Rfl: 3    medical cannabis, Take 1 each by mouth 2 times a day. Gummies, Disp: , Rfl:     rosuvastatin (Crestor) 20 mg tablet, TAKE 1 TABLET BY MOUTH EVERY DAY, Disp: 90 tablet, Rfl: 3    SAW PALMETTO ORAL, Take 1 mg by mouth once daily., Disp: , Rfl:     turmeric-turmeric root extract 450-50 mg capsule, Take 450 mg by mouth if needed., Disp: , Rfl:      Objective   Vitals:    12/27/24 1127   BP: 120/80      Physical Exam  Constitutional: Alert, no acute distress  HEENT: Well-developed, anicteric  Neck: Supple  Chest: Unlabored respirations  Heart: Regular  Abdomen: Soft, nondistended, nontender no masses [] appreciated.  Well-healed surgical incisions.  No evidence of inguinal hernia recurrence.  Extremities: Warm and well perfused, no edema  Psychiatric: Oriented appropriately, mood and affect appropriate    Assessment/Plan     Suspect episodic inguinodynia i.e. ilioinguinal nerve irritation.  No evidence of recurrent hernia.  CT from recent prior trauma workup unremarkable for inguinal hernia recurrence.  Will treat symptomatically with the episodes including NSAIDs, gabapentin, heat and limitation of activities otherwise activity as tolerated.  Follow-up as needed.    Isabela Potter MD

## 2025-02-12 DIAGNOSIS — E78.2 MIXED HYPERLIPIDEMIA: ICD-10-CM

## 2025-02-12 RX ORDER — ROSUVASTATIN CALCIUM 20 MG/1
20 TABLET, COATED ORAL DAILY
Qty: 90 TABLET | Refills: 3 | Status: SHIPPED | OUTPATIENT
Start: 2025-02-12

## 2025-02-13 DIAGNOSIS — E03.9 HYPOTHYROIDISM, UNSPECIFIED: ICD-10-CM

## 2025-02-13 RX ORDER — LEVOTHYROXINE SODIUM 112 UG/1
112 TABLET ORAL DAILY
Qty: 90 TABLET | Refills: 1 | Status: SHIPPED | OUTPATIENT
Start: 2025-02-13

## 2025-02-28 ENCOUNTER — TELEPHONE (OUTPATIENT)
Dept: PRIMARY CARE | Facility: CLINIC | Age: 70
End: 2025-02-28
Payer: MEDICARE

## 2025-02-28 DIAGNOSIS — B02.9 HERPES ZOSTER WITHOUT COMPLICATION: Primary | ICD-10-CM

## 2025-02-28 RX ORDER — FAMCICLOVIR 500 MG/1
500 TABLET ORAL 3 TIMES DAILY
Qty: 21 TABLET | Refills: 0 | Status: SHIPPED | OUTPATIENT
Start: 2025-02-28 | End: 2025-03-07

## 2025-08-08 DIAGNOSIS — E03.9 HYPOTHYROIDISM, UNSPECIFIED: ICD-10-CM

## 2025-08-09 RX ORDER — LEVOTHYROXINE SODIUM 112 UG/1
112 TABLET ORAL DAILY
Qty: 90 TABLET | Refills: 1 | Status: SHIPPED | OUTPATIENT
Start: 2025-08-09

## 2025-09-26 ENCOUNTER — APPOINTMENT (OUTPATIENT)
Dept: PRIMARY CARE | Facility: CLINIC | Age: 70
End: 2025-09-26
Payer: MEDICARE